# Patient Record
Sex: FEMALE | URBAN - METROPOLITAN AREA
[De-identification: names, ages, dates, MRNs, and addresses within clinical notes are randomized per-mention and may not be internally consistent; named-entity substitution may affect disease eponyms.]

---

## 2020-08-11 ENCOUNTER — APPOINTMENT (RX ONLY)
Dept: URBAN - METROPOLITAN AREA CLINIC 88 | Facility: CLINIC | Age: 80
Setting detail: DERMATOLOGY
End: 2020-08-11

## 2020-08-11 DIAGNOSIS — L72.0 EPIDERMAL CYST: ICD-10-CM

## 2020-08-11 DIAGNOSIS — L90.5 SCAR CONDITIONS AND FIBROSIS OF SKIN: ICD-10-CM

## 2020-08-11 DIAGNOSIS — L82.1 OTHER SEBORRHEIC KERATOSIS: ICD-10-CM

## 2020-08-11 PROCEDURE — ? COUNSELING

## 2020-08-11 PROCEDURE — ? ACNE SURGERY

## 2020-08-11 PROCEDURE — 99203 OFFICE O/P NEW LOW 30 MIN: CPT

## 2020-08-11 PROCEDURE — ? RECOMMENDATIONS

## 2020-08-11 ASSESSMENT — LOCATION DETAILED DESCRIPTION DERM
LOCATION DETAILED: NASAL SUPRATIP
LOCATION DETAILED: SUPERIOR THORACIC SPINE
LOCATION DETAILED: RIGHT SUPERIOR MEDIAL UPPER BACK

## 2020-08-11 ASSESSMENT — LOCATION SIMPLE DESCRIPTION DERM
LOCATION SIMPLE: UPPER BACK
LOCATION SIMPLE: RIGHT UPPER BACK
LOCATION SIMPLE: NOSE

## 2020-08-11 ASSESSMENT — LOCATION ZONE DERM
LOCATION ZONE: TRUNK
LOCATION ZONE: NOSE

## 2020-08-11 NOTE — PROCEDURE: ACNE SURGERY
Acne Type: Comedonal Lesions
Render Number Of Lesions Treated: no
Detail Level: Simple
Prep Text (Optional): Prior to removal the treatment areas were prepped in the usual fashion.
Extraction Method: comedo extractor
Post-Care Instructions: I reviewed with the patient in detail post-care instructions. Patient is to keep the treatment areas dry overnight, and then apply bacitracin twice daily until healed. Patient may apply hydrogen peroxide soaks to remove any crusting.
Consent: Verbal Consent was obtained and risks were reviewed including but not limited to scarring, infection, bleeding, scabbing, incomplete removal, and allergy to anesthesia.

## 2020-08-11 NOTE — PROCEDURE: RECOMMENDATIONS
Recommendation Preamble: The following recommendations were made during the visit to help with treatment: ref to plastic surgeon
Detail Level: Zone